# Patient Record
Sex: MALE | Race: OTHER | NOT HISPANIC OR LATINO
[De-identification: names, ages, dates, MRNs, and addresses within clinical notes are randomized per-mention and may not be internally consistent; named-entity substitution may affect disease eponyms.]

---

## 2023-03-22 PROBLEM — Z00.00 ENCOUNTER FOR PREVENTIVE HEALTH EXAMINATION: Status: ACTIVE | Noted: 2023-03-22

## 2023-04-03 ENCOUNTER — APPOINTMENT (OUTPATIENT)
Dept: ORTHOPEDIC SURGERY | Facility: CLINIC | Age: 58
End: 2023-04-03
Payer: COMMERCIAL

## 2023-04-03 DIAGNOSIS — S62.644A NONDISPLACED FRACTURE OF PROXIMAL PHALANX OF RIGHT RING FINGER, INITIAL ENCOUNTER FOR CLOSED FRACTURE: ICD-10-CM

## 2023-04-03 DIAGNOSIS — M94.262 CHONDROMALACIA, LEFT KNEE: ICD-10-CM

## 2023-04-03 DIAGNOSIS — M54.16 RADICULOPATHY, LUMBAR REGION: ICD-10-CM

## 2023-04-03 DIAGNOSIS — M51.9 UNSPECIFIED THORACIC, THORACOLUMBAR AND LUMBOSACRAL INTERVERTEBRAL DISC DISORDER: ICD-10-CM

## 2023-04-03 DIAGNOSIS — M77.12 LATERAL EPICONDYLITIS, LEFT ELBOW: ICD-10-CM

## 2023-04-03 DIAGNOSIS — M77.8 OTHER ENTHESOPATHIES, NOT ELSEWHERE CLASSIFIED: ICD-10-CM

## 2023-04-03 DIAGNOSIS — M50.90 CERVICAL DISC DISORDER, UNSPECIFIED, UNSPECIFIED CERVICAL REGION: ICD-10-CM

## 2023-04-03 DIAGNOSIS — S62.642A NONDISPLACED FRACTURE OF PROXIMAL PHALANX OF RIGHT MIDDLE FINGER, INITIAL ENCOUNTER FOR CLOSED FRACTURE: ICD-10-CM

## 2023-04-03 PROCEDURE — 73560 X-RAY EXAM OF KNEE 1 OR 2: CPT | Mod: LT

## 2023-04-03 PROCEDURE — 72050 X-RAY EXAM NECK SPINE 4/5VWS: CPT

## 2023-04-03 PROCEDURE — 73030 X-RAY EXAM OF SHOULDER: CPT | Mod: LT

## 2023-04-03 PROCEDURE — 99203 OFFICE O/P NEW LOW 30 MIN: CPT

## 2023-04-03 PROCEDURE — 72110 X-RAY EXAM L-2 SPINE 4/>VWS: CPT
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
## 2023-04-03 NOTE — ASSESSMENT
[FreeTextEntry1] :  he can continue with chiropractor but I recommended some physical therapy to his left knee left shoulder neck and back  I also placed him on Mobic as an anti-inflammatory depending on his improvement  might consider cortisone injection or further diagnostic such as MRI return nerve tests\par \par It is my opinion based on the information in hand that the gentleman is totally disabled unable to work I will see him in a couple months

## 2023-04-03 NOTE — IMAGING
[de-identified] :  pleasant easy to examine in no distress\par \par Cervical spine some swelling spasm posteriorly some head forward posturing crepitus through range of motion which is limited no pain on compression\par \par Cervical x-rays mild degenerative changes C6-7 \par \par Left shoulder guarded motion impingement positive [place] sign negative [place] and Speed tests \par \par X-ray left shoulder minimal AC joint changes shoulder located no impingement lesion \par \par Left elbow tenderness on the lateral side worse with resisted supination extension at the wrist\par \par Lumbar spine tight dorsal lumbar fascia and hamstrings positive straight leg  on left mild limits in motion no punch tenderness negative cough and sneeze effect\par \par X-rays lumbar spine mild discogenic changes L4-5 L5-S1 no compression fractures\par \par Right hand mild tenderness 3rd and 4th fingers PIP joints good motion decreased \par \par Left knee tenderness medially mild limited flexion slight antalgic gait calf soft negative Homans sign no laxity cruciate or collateral\par \par X-ray left knee no fracture dislocation no arthritic changes\par  mild dysesthesia diffusely left foot and ankle no swelling or edema Achilles intact negative anterior drawer

## 2023-07-19 ENCOUNTER — RX RENEWAL (OUTPATIENT)
Age: 58
End: 2023-07-19

## 2023-08-29 ENCOUNTER — APPOINTMENT (OUTPATIENT)
Dept: ORTHOPEDIC SURGERY | Facility: CLINIC | Age: 58
End: 2023-08-29

## 2023-10-19 ENCOUNTER — RX RENEWAL (OUTPATIENT)
Age: 58
End: 2023-10-19

## 2023-10-19 RX ORDER — MELOXICAM 15 MG/1
15 TABLET ORAL
Qty: 30 | Refills: 2 | Status: ACTIVE | COMMUNITY
Start: 2023-04-03 | End: 1900-01-01

## 2023-10-30 ENCOUNTER — APPOINTMENT (OUTPATIENT)
Dept: ORTHOPEDIC SURGERY | Facility: CLINIC | Age: 58
End: 2023-10-30